# Patient Record
Sex: MALE | Race: WHITE
[De-identification: names, ages, dates, MRNs, and addresses within clinical notes are randomized per-mention and may not be internally consistent; named-entity substitution may affect disease eponyms.]

---

## 2017-05-29 ENCOUNTER — HOSPITAL ENCOUNTER (EMERGENCY)
Dept: HOSPITAL 45 - C.EDB | Age: 22
LOS: 1 days | Discharge: HOME | End: 2017-05-30
Payer: COMMERCIAL

## 2017-05-29 VITALS — TEMPERATURE: 98.42 F

## 2017-05-29 VITALS
BODY MASS INDEX: 25.59 KG/M2 | HEIGHT: 67.99 IN | WEIGHT: 168.87 LBS | WEIGHT: 168.87 LBS | BODY MASS INDEX: 25.59 KG/M2 | HEIGHT: 67.99 IN

## 2017-05-29 DIAGNOSIS — M77.8: Primary | ICD-10-CM

## 2017-05-29 DIAGNOSIS — F17.200: ICD-10-CM

## 2017-05-29 NOTE — EMERGENCY ROOM VISIT NOTE
History


Report prepared by Ashely:  Caitlin Lares


Under the Supervision of:  Dr. Indra Arevalo D.O.


First contact with patient:  23:45


Chief Complaint:  KNEEPAIN


Stated Complaint:  SOMETHING WRONG WITH LEFT KNEE





History of Present Illness


The patient is a 21 year old male who presents to the Emergency Room with 

complaints of worsening left knee pain starting 3 days ago. The pain started at 

night 3 days ago after he returned home from work. He works in construction and 

moving furniture. He does not remember injuring his knee, twisting or turning 

his knee. He describes his pain as an ache. The pain worsens with movement of 

his knee and walking. He is unable to fully bend or extend his knee because of 

the pain. Today his knee began swelling, prompting him to present to the ED.





   Source of History:  patient


   Onset:  3 days ago


   Position:  knee (left)


   Quality:  ache


   Timing:  worsening


   Modifying Factors (Worsening):  movement


Note:


Pt reports knee swelling.





Review of Systems


See HPI for pertinent positives & negatives. A total of 6 systems reviewed and 

were otherwise negative.





Past Medical & Surgical


Medical Problems:


(1) No Known Active Medical Problems








Family History


No pertinent family history stated.





Social History


Smoking Status:  Current Every Day Smoker


Alcohol Use:  occasionally


Occupation Status:  [employer] student





Current/Historical Medications


No Active Prescriptions or Reported Meds





Allergies


Coded Allergies:  


     No Known Allergies (Unverified , 5/30/17)





Physical Exam


Vital Signs











  Date Time  Temp Pulse Resp B/P Pulse Ox O2 Delivery O2 Flow Rate FiO2


 


5/29/17 23:40 36.9 118 18 141/81 96 Room Air  











Physical Exam


GENERAL: Awake, alert, well-appearing, in no distress


HENT: Normocephalic, atraumatic. Oropharynx unremarkable.


EYES: Normal conjunctiva. Sclera non-icteric.


NECK: Supple. No nuchal rigidity. FROM. No JVD.


RESPIRATORY: Clear to auscultation.


CARDIAC: Regular rate, normal rhythm. Extremities warm and well perfused. 

Pulses equal.


ABDOMEN: Soft, non-distended. No tenderness to palpation. No rebound or 

guarding. No masses.


RECTAL: Deferred.


MUSCULOSKELETAL: Chest examination reveals no tenderness. The back is 

symmetrical on inspection without obvious abnormality. There is no CVA 

tenderness to palpation. No joint edema. 


LOWER EXTREMITIES: Calves are equal size bilaterally and non-tender. No edema. 

No discoloration. Patella nontender, negative for anterior drawer, some 

tenderness at quadriceps insertion with mild edema, but able to terminally 

extend, neurovascularly intact distally. 


NEURO: Normal sensorium. No sensory or motor deficits noted. 


SKIN: No rash or jaundice noted.





Medical Decision & Procedures


ER Provider


Diagnostic Interpretation:


X-ray: Per my interpretation.





Knee X-ray is negative.





Medications Administered











 Medications


  (Trade)  Dose


 Ordered  Sig/Charissa


 Route  Start Time


 Stop Time Status Last Admin


Dose Admin


 


 Ibuprofen


  (Motrin Tab)  600 mg  NOW  STAT


 PO  5/29/17 23:51


 5/29/17 23:53 DC 5/30/17 00:28


600 MG











ED Course


2347: The patient was evaluated in room B10. A complete history and physical 

exam was performed.





2351: Ibuprofen 600 mg PO. 





0037: I reevaluated the patient. Discussed results and discharge instructions: 

he verbalized understanding and agreement. The patient is ready for discharge.





Medical Decision


Differential diagnoses include but are not limited to; strain, sprain, contusion

, tendonitis. 





Patient resting in no distress on repeat examination.  Patient's exam reveals 

possible quadriceps tendinitis.  Patient was placed in a knee immobilizer.  I 

discussed the workup with the patient patient's x-ray as interpreted by me was 

negative for fracture dislocation.





Impression





 Primary Impression:  


 Quadriceps tendonitis





Scribe Attestation


The scribe's documentation has been prepared under my direction and personally 

reviewed by me in its entirety. I confirm that the note above accurately 

reflects all work, treatment, procedures, and medical decision making performed 

by me.





Departure Information


Dispostion


Home / Self-Care





Prescriptions





Ibuprofen (Motrin) 800 Mg Tab


800 MG PO Q8H for 5 Days, #15 TAB


   Prov: Indra Arevalo, DO         5/30/17





Referrals


No Doctor, Assigned (PCP)





Patient Instructions


ED Sprain Knee, My [facility]

## 2017-05-30 VITALS — SYSTOLIC BLOOD PRESSURE: 152 MMHG | DIASTOLIC BLOOD PRESSURE: 74 MMHG | OXYGEN SATURATION: 98 % | HEART RATE: 92 BPM

## 2017-05-30 NOTE — DIAGNOSTIC IMAGING REPORT
LEFT KNEE 3 VIEWS



HISTORY: Left knee  pain



COMPARISON: Left knee 6/4/2011.



FINDINGS: There is no fracture or dislocation. Soft tissues are unremarkable. No

radiopaque foreign bodies. No knee effusion. Cartilage spaces are maintained for

age.



IMPRESSION:  

No fractures.







Electronically signed by:  Sidney Gore M.D.

5/30/2017 7:15 AM



Dictated Date/Time:  5/30/2017 7:14 AM

## 2018-02-26 ENCOUNTER — HOSPITAL ENCOUNTER (EMERGENCY)
Dept: HOSPITAL 45 - C.EDB | Age: 23
Discharge: HOME | End: 2018-02-26
Payer: SELF-PAY

## 2018-02-26 VITALS
HEIGHT: 67.99 IN | BODY MASS INDEX: 25.06 KG/M2 | HEIGHT: 67.99 IN | BODY MASS INDEX: 25.06 KG/M2 | WEIGHT: 165.35 LBS | WEIGHT: 165.35 LBS

## 2018-02-26 VITALS — TEMPERATURE: 98.96 F

## 2018-02-26 VITALS — DIASTOLIC BLOOD PRESSURE: 80 MMHG | SYSTOLIC BLOOD PRESSURE: 157 MMHG | HEART RATE: 97 BPM | OXYGEN SATURATION: 100 %

## 2018-02-26 VITALS — OXYGEN SATURATION: 98 %

## 2018-02-26 DIAGNOSIS — S29.9XXA: Primary | ICD-10-CM

## 2018-02-26 DIAGNOSIS — E87.6: ICD-10-CM

## 2018-02-26 DIAGNOSIS — F17.210: ICD-10-CM
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
## 2018-02-26 LAB
ALBUMIN SERPL-MCNC: 4.2 GM/DL (ref 3.4–5)
ALP SERPL-CCNC: 117 U/L (ref 45–117)
ALT SERPL-CCNC: 18 U/L (ref 12–78)
AST SERPL-CCNC: 9 U/L (ref 15–37)
BASOPHILS # BLD: 0.03 K/UL (ref 0–0.2)
BASOPHILS NFR BLD: 0.2 %
BUN SERPL-MCNC: 18 MG/DL (ref 7–18)
CALCIUM SERPL-MCNC: 9 MG/DL (ref 8.5–10.1)
CO2 SERPL-SCNC: 25 MMOL/L (ref 21–32)
CREAT SERPL-MCNC: 1.11 MG/DL (ref 0.6–1.4)
EOS ABS #: 0.15 K/UL (ref 0–0.5)
EOSINOPHIL NFR BLD AUTO: 320 K/UL (ref 130–400)
GLUCOSE SERPL-MCNC: 117 MG/DL (ref 70–99)
HCT VFR BLD CALC: 44.9 % (ref 42–52)
HGB BLD-MCNC: 15.4 G/DL (ref 14–18)
IG#: 0.05 K/UL (ref 0–0.02)
IMM GRANULOCYTES NFR BLD AUTO: 19.4 %
LYMPHOCYTES # BLD: 3.34 K/UL (ref 1.2–3.4)
MCH RBC QN AUTO: 29.2 PG (ref 25–34)
MCHC RBC AUTO-ENTMCNC: 34.3 G/DL (ref 32–36)
MCV RBC AUTO: 85 FL (ref 80–100)
MONO ABS #: 1.27 K/UL (ref 0.11–0.59)
MONOCYTES NFR BLD: 7.4 %
NEUT ABS #: 12.36 K/UL (ref 1.4–6.5)
NEUTROPHILS # BLD AUTO: 0.9 %
NEUTROPHILS NFR BLD AUTO: 71.8 %
PMV BLD AUTO: 10.2 FL (ref 7.4–10.4)
POTASSIUM SERPL-SCNC: 3.3 MMOL/L (ref 3.5–5.1)
PROT SERPL-MCNC: 7.9 GM/DL (ref 6.4–8.2)
RED CELL DISTRIBUTION WIDTH CV: 12.5 % (ref 11.5–14.5)
RED CELL DISTRIBUTION WIDTH SD: 38.4 FL (ref 36.4–46.3)
SODIUM SERPL-SCNC: 138 MMOL/L (ref 136–145)
WBC # BLD AUTO: 17.2 K/UL (ref 4.8–10.8)

## 2018-02-26 NOTE — DIAGNOSTIC IMAGING REPORT
(CHEST FOR PE) ANGIO WITH



CT DOSE: 344.72 mGy.cm



HISTORY:  22 years-old Male presents with acute atypical chest pain with recent

fall



TECHNIQUE: Multiple CTA images of the chest were obtained after the intravenous

administration of 107 ml Optiray 320.  Coronal and sagittal MIPS were obtained

from the axial data set and were submitted for review.  A dose lowering

technique was utilized adhering to the principles of ALARA.



COMPARISON: Chest radiograph 2/26/2018.



FINDINGS: 



CTA:  

Heart is normal in size without pericardial effusion. Aberrant right subclavian

artery. Imaged great vessels are widely patent. No aortic aneurysm or

dissection. The pulmonary arterial free is opacified to level of the

subsegmental branches and demonstrates no focal filling defects to suggest

biliary thromboembolic disease.

 

CT CHEST:  

Mild residual thymic tissue of the anterior mediastinum. Thyroid is homogeneous.

No pathologic adenopathy. No pneumothorax, pleural effusion, focal airspace

consolidation or overt pulmonary edema. Central airways are patent.



Upper abdominal structures are within normal limits. Mild bilateral

gynecomastia. There is mild cortical angulation involving the anterior portion

of the left third rib, image 201 series 4 with mild cortical angulation also

noted involving the anterior portions of the left first and second ribs. No

acute displaced fractures identified. Spine appears intact.





IMPRESSION:  

1. No acute aortic pathology or evidence of pulmonary thromboembolic disease.

2. Aberrant origin of the right subclavian artery.

3. Minimal cortical angulation involving the anterior portions of the left

first, second and  third ribs may reflect age indeterminate injury. No acute

displaced rib fracture or pneumothorax identified.







The above report was generated using voice recognition software. It may contain

grammatical, syntax or spelling errors.







Electronically signed by:  Froylan Cordero M.D.

2/26/2018 7:29 AM



Dictated Date/Time:  2/26/2018 7:21 AM

## 2018-02-26 NOTE — DIAGNOSTIC IMAGING REPORT
CHEST ONE VIEW PORTABLE



CLINICAL HISTORY: 22 years-old Male presenting with CHEST PAIN. 



TECHNIQUE: Portable upright AP view of the chest was obtained.



COMPARISON: Correlation made to CTA of the chest performed subsequently the same

day.



FINDINGS:

Cardiomediastinal silhouette normal. Lungs and pleural spaces clear. Osseous

structures normal. Upper abdomen normal.



IMPRESSION:

1.  No acute cardiopulmonary disease.







Electronically signed by:  Anselmo Salazar M.D.

2/26/2018 7:13 AM



Dictated Date/Time:  2/26/2018 7:12 AM

## 2018-02-26 NOTE — EMERGENCY ROOM VISIT NOTE
History


First contact with patient:  03:05


Chief Complaint:  RIB PAIN


Stated Complaint:  HURT RIBS AND BACK UNDER SHOULDER BLADE,SOB





History of Present Illness


The patient is a 22 year old male who presents to the Emergency Room with 

complaints of left-sided chest pain with shortness of breath and racing heart. 

patient states for the past few days has been having increasing shortness of 

breath and tonight he started to have a heart rate.  He describes the pain as 

aching, ranging in severity 7 out of 10 worse with movement and better with 

rest.  It does not radiate.  Nothing makes it better or worse.  Patient denies 

fevers, cough, congestion, abdominal pain, leg pain or swelling, bruising, 

diaphoresis.  No IV drug abuse.  No recent travel.  Patient does smoke.





Review of Systems


An 10 system review of systems was completed with positives and pertinent 

negatives listed in the HPI.





Past Medical/Surgical History


Medical Problems:


(1) No Known Active Medical Problems








Social History


Smoking Status:  Current Every Day Smoker


Alcohol Use:  occasionally


Drug Use:  marijuana


Occupation Status:  employed





Current/Historical Medications


Scheduled PRN


Hydrocodon/Acetaminophen 5MG/300MG (Vicodin (5MG/300MG)), 2 TABS PO AS DIRECTED 

PRN for Pain





Physical Exam


Vital Signs











  Date Time  Temp Pulse Resp B/P (MAP) Pulse Ox O2 Delivery O2 Flow Rate FiO2


 


2/26/18 04:10  110 18 163/84 100 Room Air  


 


2/26/18 03:35  131 17 147/80 100 Room Air  


 


2/26/18 03:23     98 Room Air  


 


2/26/18 03:22     98 Room Air  


 


2/26/18 03:12  143      


 


2/26/18 02:59 37.2 147 18 149/92 100 Room Air  











Physical Exam


VITALS: Vitals are noted on the nurse's note and reviewed by myself.  Vital 

signs tachycardia .


GENERAL: Pleasant male anxious appearing, in no acute distress, nondiaphoretic, 

well-developed well-nourished.


SKIN: The skin was without rashes, erythema, edema, or bruising.  There is no 

tenting of the skin.  Capillary reflex less than 2 seconds.


HEAD: Normocephalic atraumatic.  


EARS: External auditory canals clear, tympanic membranes pearly gray without 

erythema or effusion bilaterally.


EYES: Pupils equal round and reactive to light and accommodation.  Conjunctivae 

without injection, sclerae without icterus.  Extraocular movements intact.  


NOSE: Patent, turbinates without inflammation or discharge.  


MOUTH: Mucous membranes moist.    Pharynx without erythema or exudate.  Uvula 

midline.  Airway patent.  Tongue does not deviate.  


NECK: Supple without nuchal rigidity.  No lymphadenopathy.  No thyromegaly.  

Cervical spine is nontender.  No JVD.


HEART: Tachycardic rate and rhythm, left-sided chest tender to palpation easily 

reproducing symptoms


LUNGS: Clear to auscultation bilaterally without wheezes, rales or rhonchi.  No 

dullness to percussion.  No retractions or accessory muscle use.


ABDOMEN: Positive bowel sounds x 4.  Normal tympanic percussion.  Soft, 

nontender, without masses or organomegaly.  Barnard sign negative.  No guarding 

or rebound tenderness.


MUSCULOSKELETAL: No muscle atrophy, erythema, or edema noted.  


NEURO: Patient was alert and oriented to person place and time.  Normal 

sensation to light and sharp touch.    No focal neurological deficits.





Medical Decision & Procedures


Laboratory Results


2/26/18 03:18








Red Blood Count 5.28, Mean Corpuscular Volume 85.0, Mean Corpuscular Hemoglobin 

29.2, Mean Corpuscular Hemoglobin Concent 34.3, Mean Platelet Volume 10.2, 

Neutrophils (%) (Auto) 71.8, Lymphocytes (%) (Auto) 19.4, Monocytes (%) (Auto) 

7.4, Eosinophils (%) (Auto) 0.9, Basophils (%) (Auto) 0.2, Neutrophils # (Auto) 

12.36, Lymphocytes # (Auto) 3.34, Monocytes # (Auto) 1.27, Eosinophils # (Auto) 

0.15, Basophils # (Auto) 0.03





2/26/18 03:18

















Test


  2/26/18


03:18 2/26/18


03:29


 


White Blood Count


  17.20 K/uL


(4.8-10.8) 


 


 


Red Blood Count


  5.28 M/uL


(4.7-6.1) 


 


 


Hemoglobin


  15.4 g/dL


(14.0-18.0) 


 


 


Hematocrit 44.9 % (42-52)  


 


Mean Corpuscular Volume


  85.0 fL


() 


 


 


Mean Corpuscular Hemoglobin


  29.2 pg


(25-34) 


 


 


Mean Corpuscular Hemoglobin


Concent 34.3 g/dl


(32-36) 


 


 


Platelet Count


  320 K/uL


(130-400) 


 


 


Mean Platelet Volume


  10.2 fL


(7.4-10.4) 


 


 


Neutrophils (%) (Auto) 71.8 %  


 


Lymphocytes (%) (Auto) 19.4 %  


 


Monocytes (%) (Auto) 7.4 %  


 


Eosinophils (%) (Auto) 0.9 %  


 


Basophils (%) (Auto) 0.2 %  


 


Neutrophils # (Auto)


  12.36 K/uL


(1.4-6.5) 


 


 


Lymphocytes # (Auto)


  3.34 K/uL


(1.2-3.4) 


 


 


Monocytes # (Auto)


  1.27 K/uL


(0.11-0.59) 


 


 


Eosinophils # (Auto)


  0.15 K/uL


(0-0.5) 


 


 


Basophils # (Auto)


  0.03 K/uL


(0-0.2) 


 


 


RDW Standard Deviation


  38.4 fL


(36.4-46.3) 


 


 


RDW Coefficient of Variation


  12.5 %


(11.5-14.5) 


 


 


Immature Granulocyte % (Auto) 0.3 %  


 


Immature Granulocyte # (Auto)


  0.05 K/uL


(0.00-0.02) 


 


 


Anion Gap


  11.0 mmol/L


(3-11) 


 


 


Est Creatinine Clear Calc


Drug Dose 99.3 ml/min 


  


 


 


Estimated GFR (


American) 107.1 


  


 


 


Estimated GFR (Non-


American 92.4 


  


 


 


BUN/Creatinine Ratio 16.5 (10-20)  


 


Calcium Level


  9.0 mg/dl


(8.5-10.1) 


 


 


Magnesium Level


  2.1 mg/dl


(1.8-2.4) 


 


 


Total Bilirubin


  0.2 mg/dl


(0.2-1) 


 


 


Direct Bilirubin


  < 0.1 mg/dl


(0-0.2) 


 


 


Aspartate Amino Transf


(AST/SGOT) 9 U/L (15-37) 


  


 


 


Alanine Aminotransferase


(ALT/SGPT) 18 U/L (12-78) 


  


 


 


Alkaline Phosphatase


  117 U/L


() 


 


 


Total Protein


  7.9 gm/dl


(6.4-8.2) 


 


 


Albumin


  4.2 gm/dl


(3.4-5.0) 


 


 


Thyroid Stimulating Hormone


(TSH) 1.750 uIu/ml


(0.300-4.500) 


 


 


Bedside Troponin I


  


  < 0.030 ng/ml


(0-0.045)











Medications Administered











 Medications


  (Trade)  Dose


 Ordered  Sig/Charissa


 Route  Start Time


 Stop Time Status Last Admin


Dose Admin


 


 Sodium Chloride  2,000 ml @ 


 999 mls/hr  Q2H1M STAT


 IV  2/26/18 03:07


 2/26/18 05:07 DC 2/26/18 03:29


999 MLS/HR


 


 Morphine Sulfate


  (MoRPHine


 SULFATE INJ)  4 mg  NOW  STAT


 IV  2/26/18 03:14


 2/26/18 03:16 DC 2/26/18 03:29


4 MG


 


 Ondansetron HCl


  (Zofran Inj)  4 mg  NOW  STAT


 IV  2/26/18 03:14


 2/26/18 03:16 DC 2/26/18 03:29


4 MG


 


 Potassium Chloride


  (Klor-Con M10)  20 meq  NOW  STAT


 PO  2/26/18 04:51


 2/26/18 04:52 DC 2/26/18 05:00


20 MEQ











ED Course


Prior records/ancillary studies reviewed.


Triage Nursing notes reviewed.


Additional history obtained from friends.


The patient's history was concerning for chest pain. 





Differential diagnosis:


Etiologies such as cardiac ischemia, aortic dissection, pulmonary embolism, 

pneumonia, pneumothorax, musculoskeletal, infections, pericarditis, myocarditis

, esophageal rupture, gastrointestinal, as well as others were entertained. 





Physical examination:


As above.





ER treatment provided:


IV fluids, morphine, Zofran, incentive spirometry


On reassessment the patient felt better.





Diagnostic interpretation by me:


The electrocardiogram was normal sinus rhythm with no acute ST-T wave changes, 

poor baseline, rate of 150.  Impression sinus tachycardia interpreted by myself


Repeat EKG shows normal sinus rhythm with no acute ST-T wave changes with rate 

of 92 per my interpretation.  Improved from prior.





The labs revealed hypokalemia this is replaced orally.  Euthyroid.  Negative 

troponin


Stable H&H, leukocytosis





Imaging studies:


Chest x-ray with no acute consolidation, pneumothorax free of my interpretation


CTA CHEST:


Subtle age indeterminate deformity of the left first, second, and third 

anterior ribs . Remainder of


visualized bony structures are unremarkable. No pneumothorax or lung 

contusions. No hemothorax.


Normal caliber thoracic aorta with pulsation artifact. Congenital variant 

aberrant origin of the right


subclavian artery. No para-aortic fluid. No retrosternal hematoma or 

hemopericardium.


Residual thymic tissue in anterior mediastinum.


Radiologist: Myriam Giraldo M.D.





Exam and history seem consistent with chest wall injury.  No pneumothorax or 

pulmonary contusion.  Patient's heart rate came down.  He was quite anxious 

appearing upon initial evaluation.  He came down on its own.  He was advised to 

do incentive spirometry 10 times an hour for the next 2 weeks to take NSAIDs as 

needed for the pain.  He was advised to follow-up family care in a day or 2 

here in the ER sooner for chest pain, fevers, difficulty breathing, worsening 

signs or symptoms or as needed.  Patient was neurovascularly and neurologically 

intact.  He had stable vital signs.  He was not hypoxic.  He was not 

retracting.By the evaluation outlined above emergent etiologies such as cardiac 

ischemia, aortic dissection, pulmonary embolism, pneumonia, pneumothorax, 

infections, pericarditis, myocarditis, gastrointestinal, as well as others were 

deemed relatively unlikely.  Patient states he is very anxious in the 

healthcare setting.  This happened to him twice before.  Patient's heart rate 

came down on its own.





The pt informed about the findings as listed above. All questions were answered 

and  pleased with the treatment. Return instructions were outlined and the 

patient was discharged in stable condition. 











Referral:


The patient was referred back to primary care physician for follow-up in 2 to 3 

days for a recheck of the current condition.





Case reviewed with my attending





The chart was completed utilizing Dragon Speech voice recognition software.   

Grammatical errors, random word insertions, pronoun errors, and incomplete 

sentences are an occassional consequence of this system due to software 

limitations, ambient noise, and hardware issues.  Any formal questions or 

concerns about the content, text, or information contained within the body of 

this dictation should be directly addressed to the physician assistant for 

clarification.





Medical Decision


As above





Medication Reconcilliation


Current Medication List:  was personally reviewed by me





Blood Pressure Screening


Patient's blood pressure:  Elevated blood pressure


Blood pressure disposition:  Elevated BP felt to be situational





Impression





 Primary Impression:  


 Chest wall injury


 Additional Impression:  


 Hypokalemia





Departure Information


Dispostion


Home / Self-Care





Condition


GOOD





Referrals


No Doctor, Assigned (PCP)





Patient Instructions


My [facility]





Additional Instructions





Incentive spirometry 10 times an hour while you are awake for the next 2 weeks.





Ibuprofen(Motrin, Advil) may be used for fever or pain.  Use 600mg every six 

hours as needed.  Take with food.  Avoid using more than 2400mg in a 24 hour 

period.  Do not use 2400mg per day for more than three consecutive days without 

physician direction.  Prolonged inappropriate use can lead to stomach upset or 

ulcers. 


(AND/OR)


Acetaminophen(Tylenol) may be used for fever or pain.  Use 1000mg every six 

hours as needed.  Avoid using more than 3000mg in a 24 hour period.  





Rest and drink plenty of fluids as tolerated.





Continue current medications.





Recommend that you stop smoking.





Avoid strenuous activities and anything that worsens your pain.  Resume normal 

activities once your symptoms resolve.    





Return to the ER immediately for worsening or persistent chest pain, abdominal 

pain, vomiting, fevers, difficulty breathing, worsening of your condition, or 

as needed.





Follow up with your primary physician in 2-3 days for a recheck of your current 

condition.





Problem Qualifiers








 Primary Impression:  


 Chest wall injury


 Encounter type:  initial encounter  Qualified Codes:  S29.9XXA - Unspecified 

injury of thorax, initial encounter